# Patient Record
(demographics unavailable — no encounter records)

---

## 2025-01-02 NOTE — ASSESSMENT
[FreeTextEntry1] : 74-year-old woman with superb control of BP, and resolution of dizziness after stopping hydrochlorothiazide 3 months ago.  Now I am hoping that her calcium has improved as well.  She will go to the lab today for BMP, Cystatin C, PTH.  She will return in 4 months preceded by labs.  She will follow-up with Dr. Zurita.

## 2025-01-02 NOTE — HISTORY OF PRESENT ILLNESS
[FreeTextEntry1] : 74-year-old woman referred by Dr. Biis Zurita because of hypertension, elevated creatinine, prediabetes.  An elevated creatinine was first noted about 3-1/2 years ago.  Her creatinine has been in the 1.4-1.6 range in recent times, and was 1.58 when last checked on September 9, with a BUN of 35, GFR of 34-36 depending on method used, K4.7, CO2 24, calcium 10.6, PTH normal at 32, urine microalbumin normal at 25.  Her kidneys were slightly small and mildly echogenic on ultrasound in 2024.  BP has been beautifully controlled on lisinopril 20 mg daily.  I eliminated hydrochlorothiazide in September, hoping that would allow her calcium to normalize.  In addition, she had complained of dizziness with a BP in September of 96/60 and that dizziness has totally resolved, even though her BP remains low.

## 2025-01-02 NOTE — CONSULT LETTER
[Dear  ___] : Dear  [unfilled], [Consult Letter:] : I had the pleasure of evaluating your patient, [unfilled]. [Please see my note below.] : Please see my note below. [Consult Closing:] : Thank you very much for allowing me to participate in the care of this patient.  If you have any questions, please do not hesitate to contact me. [Sincerely,] : Sincerely, [FreeTextEntry2] : Dr. Bisi Zurita [FreeTextEntry3] : Sincerely,   Ryan Cross MD, FACP

## 2025-04-17 NOTE — ASSESSMENT
[FreeTextEntry1] : 74-year-old woman with stable CKD stage IIIb, mild hypercalcemia of uncertain etiology.  She feels well and BP has been well-controlled but will be continuing to observe it on the lower dose of lisinopril.  She has no cough or tickle in the throat.  She will return in 4 to 5 months.

## 2025-04-17 NOTE — HISTORY OF PRESENT ILLNESS
[FreeTextEntry1] : 74-year-old woman referred by Dr. Bisi Zurita because of hypertension, elevated creatinine, prediabetes.  Her creatinine was first noted to be elevated about 4 years ago and has been stable in the 1.4-1.6 range generally.  It is currently stable at 1.55, with a GFR of 35-37 depending on method used.  Her calcium remains slightly elevated at 10.7, despite being off calcium supplements and any vitamin D.  Her PTH is normal at 25.  We also stopped her thiazide in the hopes of reducing calcium.  Her hemoglobin is 13.4, K5.4 on lisinopril, CO2 25.  Her lisinopril was apparently reduced from 20 down to 5 mg daily.  Her BP has been normal at other visits.  She took a box in 2 trains to get here today so her office BP of 132/78 is probably slightly misleading.